# Patient Record
Sex: MALE | URBAN - METROPOLITAN AREA
[De-identification: names, ages, dates, MRNs, and addresses within clinical notes are randomized per-mention and may not be internally consistent; named-entity substitution may affect disease eponyms.]

---

## 2018-03-19 ENCOUNTER — APPOINTMENT (RX ONLY)
Dept: URBAN - METROPOLITAN AREA CLINIC 145 | Facility: CLINIC | Age: 83
Setting detail: DERMATOLOGY
End: 2018-03-19

## 2018-03-19 DIAGNOSIS — L82.1 OTHER SEBORRHEIC KERATOSIS: ICD-10-CM

## 2018-03-19 DIAGNOSIS — Z79.899 OTHER LONG TERM (CURRENT) DRUG THERAPY: ICD-10-CM

## 2018-03-19 DIAGNOSIS — L57.8 OTHER SKIN CHANGES DUE TO CHRONIC EXPOSURE TO NONIONIZING RADIATION: ICD-10-CM

## 2018-03-19 DIAGNOSIS — L81.4 OTHER MELANIN HYPERPIGMENTATION: ICD-10-CM

## 2018-03-19 DIAGNOSIS — L10.2 PEMPHIGUS FOLIACEOUS: ICD-10-CM

## 2018-03-19 PROCEDURE — ? SUNSCREEN RECOMMENDATIONS

## 2018-03-19 PROCEDURE — ? PRODUCT LINE (SUNSCREENS)

## 2018-03-19 PROCEDURE — ? PRESCRIPTION

## 2018-03-19 PROCEDURE — ? COUNSELING

## 2018-03-19 PROCEDURE — ? ORDER TESTS

## 2018-03-19 PROCEDURE — 99214 OFFICE O/P EST MOD 30 MIN: CPT

## 2018-03-19 RX ORDER — TRIAMCINOLONE ACETONIDE 1 MG/G
CREAM TOPICAL
Qty: 1 | Refills: 0 | Status: ERX | COMMUNITY
Start: 2018-03-19

## 2018-03-19 RX ORDER — DAPSONE 25 MG/1
TABLET ORAL QD
Qty: 30 | Refills: 2 | Status: ERX

## 2018-03-19 RX ADMIN — TRIAMCINOLONE ACETONIDE: 1 CREAM TOPICAL at 18:50

## 2018-03-19 ASSESSMENT — LOCATION DETAILED DESCRIPTION DERM
LOCATION DETAILED: RIGHT CENTRAL MALAR CHEEK
LOCATION DETAILED: LEFT SUPERIOR CENTRAL MALAR CHEEK
LOCATION DETAILED: LEFT CLAVICULAR NECK

## 2018-03-19 ASSESSMENT — LOCATION SIMPLE DESCRIPTION DERM
LOCATION SIMPLE: LEFT CHEEK
LOCATION SIMPLE: RIGHT CHEEK
LOCATION SIMPLE: LEFT ANTERIOR NECK

## 2018-03-19 ASSESSMENT — LOCATION ZONE DERM
LOCATION ZONE: FACE
LOCATION ZONE: NECK

## 2018-03-19 ASSESSMENT — SEVERITY ASSESSMENT: SEVERITY: CLEAR

## 2018-03-19 NOTE — PROCEDURE: PRODUCT LINE (SUNSCREENS)
Product 35 Units: 0
Product 69 Price (In Dollars - Numeric Only, No Special Characters Or $): 0.00
Product 2 Price (In Dollars - Numeric Only, No Special Characters Or $): 34
Name Of Product 1: Elta MD Lotion
Product 1 Units: 1
Send Charges To Patient Encounter: No
Detail Level: Zone
Name Of Product 2: Elta MD Clear

## 2018-03-19 NOTE — PROCEDURE: SUNSCREEN RECOMMENDATIONS
Detail Level: Generalized
Products Recommended: Elta MD sunscreen Products
General Sunscreen Counseling: I highly recommend the use of broad spectrum sun screen (UVA AND UVB protection) with an SPF 30 or higher.  Physical sunscreens are preferred over chemical sunscreens as they reflect the sun’s rays instead of absorbing and scattering them.  Physical sunscreens are found in the form of the minerals, Zinc Oxide and Titanium Dioxide.  A mix of both chemical and physical is okay, as well. Applying sunscreen every morning regardless of your planned daily activity is important.  Sunscreen is not just for the beach.  Studies shows, that we are exposed to the sun 18 to 20 hours of incidental sun per week (meaning, times when you are going to and from the car, walking the dog, checking the mail, etc.)  Skin cancer is more common on the left side of the body due to the exposure that we receive while driving a car. Amount does matter!  Use a “quarter size” for areas face, neck and chest.  A “quarter size” per arm and per leg if wearing shorts, skirts or capris. If at the beach, pool and/or park reapply every two hours.  Use 1 ounce or a shot glass every two hours if wearing a bathing suit.  This means that after spending a day outside you should run through a whole 8 ounce bottle of sunscreen. Keep in mind that the worse sun hours are 10:00am thru 3:00pm.  If possible, avoid being outside within that time period. I also recommended a lip balm with a sunscreen as well. Sun protective clothing can be used in lieu of sunscreen but must be worn the entire time you are exposed to the sun's rays.

## 2018-03-19 NOTE — HPI: BLISTERS (PEMPHIGUS FOLIACEOUS)
How Severe Is It?: moderate
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
Additional History: Labs 2.3.18 \\nGluc 102 H\\nRbc 3.69 L\\nHemoglblin 10.5 L\\nHemarocrit 33.6 L

## 2018-06-19 ENCOUNTER — APPOINTMENT (RX ONLY)
Dept: URBAN - METROPOLITAN AREA CLINIC 145 | Facility: CLINIC | Age: 83
Setting detail: DERMATOLOGY
End: 2018-06-19

## 2018-06-19 DIAGNOSIS — L30.4 ERYTHEMA INTERTRIGO: ICD-10-CM

## 2018-06-19 DIAGNOSIS — L21.8 OTHER SEBORRHEIC DERMATITIS: ICD-10-CM

## 2018-06-19 DIAGNOSIS — L10.2 PEMPHIGUS FOLIACEOUS: ICD-10-CM

## 2018-06-19 DIAGNOSIS — L85.3 XEROSIS CUTIS: ICD-10-CM

## 2018-06-19 PROCEDURE — ? PRESCRIPTION

## 2018-06-19 PROCEDURE — ? ADDITIONAL NOTES

## 2018-06-19 PROCEDURE — ? ORDER TESTS

## 2018-06-19 PROCEDURE — ? LAB REPORTS REVIEWED

## 2018-06-19 PROCEDURE — ? COUNSELING

## 2018-06-19 PROCEDURE — 99214 OFFICE O/P EST MOD 30 MIN: CPT

## 2018-06-19 RX ORDER — DAPSONE 25 MG/1
TABLET ORAL QD
Qty: 30 | Refills: 2 | Status: ERX

## 2018-06-19 RX ORDER — HYDROCORTISONE 25 MG/G
CREAM TOPICAL
Qty: 1 | Refills: 5 | Status: ERX | COMMUNITY
Start: 2018-06-19

## 2018-06-19 RX ORDER — NYSTATIN 100000 [USP'U]/G
CREAM TOPICAL
Qty: 1 | Refills: 5 | Status: ERX | COMMUNITY
Start: 2018-06-19

## 2018-06-19 RX ORDER — AMMONIUM LACTATE 120 MG/G
LOTION TOPICAL
Qty: 1 | Refills: 5 | Status: ERX | COMMUNITY
Start: 2018-06-19

## 2018-06-19 RX ADMIN — AMMONIUM LACTATE: 120 LOTION TOPICAL at 18:05

## 2018-06-19 RX ADMIN — HYDROCORTISONE: 25 CREAM TOPICAL at 18:07

## 2018-06-19 RX ADMIN — NYSTATIN: 100000 CREAM TOPICAL at 18:08

## 2018-06-19 ASSESSMENT — LOCATION DETAILED DESCRIPTION DERM
LOCATION DETAILED: LEFT SUPERIOR MEDIAL FOREHEAD
LOCATION DETAILED: LEFT PROXIMAL PRETIBIAL REGION
LOCATION DETAILED: LEFT ANTERIOR PROXIMAL THIGH
LOCATION DETAILED: RIGHT LATERAL SUPERIOR CHEST
LOCATION DETAILED: INFERIOR MID FOREHEAD
LOCATION DETAILED: RIGHT DISTAL PRETIBIAL REGION

## 2018-06-19 ASSESSMENT — LOCATION SIMPLE DESCRIPTION DERM
LOCATION SIMPLE: INFERIOR FOREHEAD
LOCATION SIMPLE: CHEST
LOCATION SIMPLE: LEFT PRETIBIAL REGION
LOCATION SIMPLE: LEFT THIGH
LOCATION SIMPLE: LEFT FOREHEAD
LOCATION SIMPLE: RIGHT PRETIBIAL REGION

## 2018-06-19 ASSESSMENT — LOCATION ZONE DERM
LOCATION ZONE: TRUNK
LOCATION ZONE: FACE
LOCATION ZONE: LEG

## 2018-09-17 ENCOUNTER — APPOINTMENT (RX ONLY)
Dept: URBAN - METROPOLITAN AREA CLINIC 145 | Facility: CLINIC | Age: 83
Setting detail: DERMATOLOGY
End: 2018-09-17

## 2018-09-17 DIAGNOSIS — L72.0 EPIDERMAL CYST: ICD-10-CM

## 2018-09-17 DIAGNOSIS — L10.2 PEMPHIGUS FOLIACEOUS: ICD-10-CM

## 2018-09-17 DIAGNOSIS — L30.4 ERYTHEMA INTERTRIGO: ICD-10-CM

## 2018-09-17 DIAGNOSIS — D69.2 OTHER NONTHROMBOCYTOPENIC PURPURA: ICD-10-CM

## 2018-09-17 DIAGNOSIS — L85.3 XEROSIS CUTIS: ICD-10-CM

## 2018-09-17 DIAGNOSIS — L21.8 OTHER SEBORRHEIC DERMATITIS: ICD-10-CM

## 2018-09-17 PROCEDURE — ? ORDER TESTS

## 2018-09-17 PROCEDURE — 99214 OFFICE O/P EST MOD 30 MIN: CPT

## 2018-09-17 PROCEDURE — ? PRESCRIPTION

## 2018-09-17 PROCEDURE — ? COUNSELING

## 2018-09-17 PROCEDURE — ? ADDITIONAL NOTES

## 2018-09-17 RX ORDER — HYDROCORTISONE 25 MG/G
CREAM TOPICAL
Qty: 1 | Refills: 5

## 2018-09-17 RX ORDER — DAPSONE 25 MG/1
TABLET ORAL QD
Qty: 30 | Refills: 2 | Status: ERX

## 2018-09-17 RX ORDER — NYSTATIN 100000 [USP'U]/G
CREAM TOPICAL
Qty: 1 | Refills: 5

## 2018-09-17 RX ORDER — AMMONIUM LACTATE 120 MG/G
LOTION TOPICAL
Qty: 1 | Refills: 5

## 2018-09-17 ASSESSMENT — LOCATION DETAILED DESCRIPTION DERM
LOCATION DETAILED: INFERIOR MID FOREHEAD
LOCATION DETAILED: RIGHT INFERIOR LATERAL NECK
LOCATION DETAILED: LEFT ANTERIOR PROXIMAL THIGH
LOCATION DETAILED: RIGHT LATERAL SUPERIOR CHEST
LOCATION DETAILED: RIGHT DISTAL DORSAL FOREARM
LOCATION DETAILED: LEFT DISTAL DORSAL FOREARM

## 2018-09-17 ASSESSMENT — LOCATION SIMPLE DESCRIPTION DERM
LOCATION SIMPLE: INFERIOR FOREHEAD
LOCATION SIMPLE: CHEST
LOCATION SIMPLE: RIGHT ANTERIOR NECK
LOCATION SIMPLE: LEFT THIGH
LOCATION SIMPLE: RIGHT FOREARM
LOCATION SIMPLE: LEFT FOREARM

## 2018-09-17 ASSESSMENT — LOCATION ZONE DERM
LOCATION ZONE: LEG
LOCATION ZONE: FACE
LOCATION ZONE: TRUNK
LOCATION ZONE: ARM
LOCATION ZONE: NECK

## 2018-09-17 NOTE — PROCEDURE: ADDITIONAL NOTES
Additional Notes: Not Active at the time. \\n——————————————————————————————\\nPatients wife passed away in July. Patient is having trouble sleeping/ managing stress and we recommended him to see his PCP for management and he said that he doesn’t want to take more pills.

## 2019-01-21 ENCOUNTER — RX ONLY (OUTPATIENT)
Age: 84
Setting detail: RX ONLY
End: 2019-01-21

## 2019-01-21 RX ORDER — NIACINAMIDE 500 MG
TABLET ORAL
Qty: 30 | Refills: 2 | Status: ERX | COMMUNITY
Start: 2019-01-21

## 2019-01-21 RX ORDER — DOXYCYCLINE HYCLATE 100 MG/1
CAPSULE, GELATIN COATED ORAL
Qty: 30 | Refills: 2 | Status: ERX | COMMUNITY
Start: 2019-01-21